# Patient Record
Sex: MALE | Race: WHITE | Employment: OTHER | URBAN - METROPOLITAN AREA
[De-identification: names, ages, dates, MRNs, and addresses within clinical notes are randomized per-mention and may not be internally consistent; named-entity substitution may affect disease eponyms.]

---

## 2019-08-26 ENCOUNTER — HOSPITAL ENCOUNTER (OUTPATIENT)
Dept: MRI IMAGING | Age: 74
Discharge: HOME | End: 2019-08-26
Attending: OPHTHALMOLOGY
Payer: MEDICARE

## 2019-08-26 DIAGNOSIS — D31.51: ICD-10-CM

## 2019-08-26 PROCEDURE — 70553 MRI BRAIN STEM W/O & W/DYE: CPT

## 2019-08-26 PROCEDURE — 74011250636 HC RX REV CODE- 250/636: Performed by: OPHTHALMOLOGY

## 2019-08-26 PROCEDURE — A9575 INJ GADOTERATE MEGLUMI 0.1ML: HCPCS | Performed by: OPHTHALMOLOGY

## 2019-08-26 RX ORDER — GADOTERATE MEGLUMINE 376.9 MG/ML
20 INJECTION INTRAVENOUS
Status: COMPLETED | OUTPATIENT
Start: 2019-08-26 | End: 2019-08-26

## 2019-08-26 RX ADMIN — GADOTERATE MEGLUMINE 20 ML: 376.9 INJECTION INTRAVENOUS at 12:55

## 2019-11-08 ENCOUNTER — HOSPITAL ENCOUNTER (OUTPATIENT)
Dept: CT IMAGING | Age: 74
Discharge: HOME | End: 2019-11-08
Attending: OTOLARYNGOLOGY
Payer: MEDICARE

## 2019-11-08 DIAGNOSIS — J32.4 CHRONIC PANSINUSITIS: ICD-10-CM

## 2019-11-08 PROCEDURE — 70486 CT MAXILLOFACIAL W/O DYE: CPT

## 2020-02-24 ENCOUNTER — ANESTHESIA EVENT (OUTPATIENT)
Dept: SURGERY | Age: 75
End: 2020-02-24
Payer: MEDICARE

## 2020-02-24 RX ORDER — BUPROPION HYDROCHLORIDE 300 MG/1
300 TABLET ORAL DAILY
COMMUNITY

## 2020-02-24 RX ORDER — MECLIZINE HYDROCHLORIDE 25 MG/1
25 TABLET ORAL
COMMUNITY

## 2020-02-24 RX ORDER — DEXTROMETHORPHAN HYDROBROMIDE, GUAIFENESIN 5; 100 MG/5ML; MG/5ML
650 LIQUID ORAL
COMMUNITY

## 2020-02-24 RX ORDER — PRIMIDONE 50 MG/1
50 TABLET ORAL
COMMUNITY

## 2020-02-24 RX ORDER — ASPIRIN 81 MG/1
81 TABLET ORAL DAILY
COMMUNITY

## 2020-02-24 RX ORDER — DOCUSATE SODIUM 100 MG/1
100 CAPSULE, LIQUID FILLED ORAL
COMMUNITY

## 2020-02-24 RX ORDER — AZATHIOPRINE 50 MG/1
50 TABLET ORAL DAILY
COMMUNITY

## 2020-02-24 RX ORDER — CARVEDILOL 12.5 MG/1
12.5 TABLET ORAL 2 TIMES DAILY WITH MEALS
COMMUNITY

## 2020-02-25 ENCOUNTER — ANESTHESIA (OUTPATIENT)
Dept: SURGERY | Age: 75
End: 2020-02-25
Payer: MEDICARE

## 2020-02-25 ENCOUNTER — HOSPITAL ENCOUNTER (OUTPATIENT)
Age: 75
Setting detail: OUTPATIENT SURGERY
Discharge: HOME | End: 2020-02-25
Attending: OTOLARYNGOLOGY | Admitting: OTOLARYNGOLOGY
Payer: MEDICARE

## 2020-02-25 VITALS
TEMPERATURE: 98.7 F | RESPIRATION RATE: 18 BRPM | HEIGHT: 70 IN | SYSTOLIC BLOOD PRESSURE: 117 MMHG | DIASTOLIC BLOOD PRESSURE: 56 MMHG | HEART RATE: 90 BPM | WEIGHT: 213 LBS | OXYGEN SATURATION: 97 % | BODY MASS INDEX: 30.49 KG/M2

## 2020-02-25 DIAGNOSIS — L76.82 PAIN AT SURGICAL INCISION: Primary | ICD-10-CM

## 2020-02-25 PROCEDURE — 77030008684 HC TU ET CUF COVD -B: Performed by: ANESTHESIOLOGY

## 2020-02-25 PROCEDURE — 74011000258 HC RX REV CODE- 258: Performed by: NURSE ANESTHETIST, CERTIFIED REGISTERED

## 2020-02-25 PROCEDURE — 74011250636 HC RX REV CODE- 250/636: Performed by: NURSE ANESTHETIST, CERTIFIED REGISTERED

## 2020-02-25 PROCEDURE — 77030009776 HC INTUB SET LACR JDMD -B: Performed by: OTOLARYNGOLOGY

## 2020-02-25 PROCEDURE — 74011250637 HC RX REV CODE- 250/637: Performed by: OPHTHALMOLOGY

## 2020-02-25 PROCEDURE — 74011250636 HC RX REV CODE- 250/636: Performed by: OTOLARYNGOLOGY

## 2020-02-25 PROCEDURE — 77030002996 HC SUT SLK J&J -A: Performed by: OTOLARYNGOLOGY

## 2020-02-25 PROCEDURE — 74011000250 HC RX REV CODE- 250

## 2020-02-25 PROCEDURE — 77030018846 HC SOL IRR STRL H20 ICUM -A: Performed by: OTOLARYNGOLOGY

## 2020-02-25 PROCEDURE — 77030026438 HC STYL ET INTUB CARD -A: Performed by: ANESTHESIOLOGY

## 2020-02-25 PROCEDURE — 74011250636 HC RX REV CODE- 250/636: Performed by: ANESTHESIOLOGY

## 2020-02-25 PROCEDURE — 77030040922 HC BLNKT HYPOTHRM STRY -A

## 2020-02-25 PROCEDURE — 74011000250 HC RX REV CODE- 250: Performed by: NURSE ANESTHETIST, CERTIFIED REGISTERED

## 2020-02-25 PROCEDURE — 77030002933 HC SUT MCRYL J&J -A: Performed by: OTOLARYNGOLOGY

## 2020-02-25 PROCEDURE — 77030002888 HC SUT CHRMC J&J -A: Performed by: OTOLARYNGOLOGY

## 2020-02-25 PROCEDURE — 74011000250 HC RX REV CODE- 250: Performed by: OTOLARYNGOLOGY

## 2020-02-25 PROCEDURE — 76010000171 HC OR TIME 2 TO 2.5 HR INTENSV-TIER 1: Performed by: OTOLARYNGOLOGY

## 2020-02-25 PROCEDURE — 77030029099 HC BN WAX SSPC -A: Performed by: OTOLARYNGOLOGY

## 2020-02-25 PROCEDURE — 74011250636 HC RX REV CODE- 250/636

## 2020-02-25 PROCEDURE — 77030002986 HC SUT PROL J&J -A: Performed by: OTOLARYNGOLOGY

## 2020-02-25 PROCEDURE — 77030040361 HC SLV COMPR DVT MDII -B: Performed by: OTOLARYNGOLOGY

## 2020-02-25 PROCEDURE — 77030031139 HC SUT VCRL2 J&J -A: Performed by: OTOLARYNGOLOGY

## 2020-02-25 PROCEDURE — 77030010208: Performed by: OTOLARYNGOLOGY

## 2020-02-25 PROCEDURE — 77030002916 HC SUT ETHLN J&J -A: Performed by: OTOLARYNGOLOGY

## 2020-02-25 PROCEDURE — 76210000017 HC OR PH I REC 1.5 TO 2 HR: Performed by: OTOLARYNGOLOGY

## 2020-02-25 PROCEDURE — 76060000035 HC ANESTHESIA 2 TO 2.5 HR: Performed by: OTOLARYNGOLOGY

## 2020-02-25 PROCEDURE — 76210000021 HC REC RM PH II 0.5 TO 1 HR: Performed by: OTOLARYNGOLOGY

## 2020-02-25 RX ORDER — ROCURONIUM BROMIDE 10 MG/ML
INJECTION, SOLUTION INTRAVENOUS AS NEEDED
Status: DISCONTINUED | OUTPATIENT
Start: 2020-02-25 | End: 2020-02-25 | Stop reason: HOSPADM

## 2020-02-25 RX ORDER — OXYMETAZOLINE HCL 0.05 %
2 SPRAY, NON-AEROSOL (ML) NASAL
Status: COMPLETED | OUTPATIENT
Start: 2020-02-25 | End: 2020-02-25

## 2020-02-25 RX ORDER — CEPHALEXIN 250 MG/1
250 CAPSULE ORAL 4 TIMES DAILY
Qty: 20 CAP | Refills: 0 | Status: SHIPPED | OUTPATIENT
Start: 2020-02-25 | End: 2020-03-01

## 2020-02-25 RX ORDER — ONDANSETRON 2 MG/ML
4 INJECTION INTRAMUSCULAR; INTRAVENOUS AS NEEDED
Status: DISCONTINUED | OUTPATIENT
Start: 2020-02-25 | End: 2020-02-25 | Stop reason: HOSPADM

## 2020-02-25 RX ORDER — LIDOCAINE HYDROCHLORIDE AND EPINEPHRINE 10; 10 MG/ML; UG/ML
INJECTION, SOLUTION INFILTRATION; PERINEURAL AS NEEDED
Status: DISCONTINUED | OUTPATIENT
Start: 2020-02-25 | End: 2020-02-25 | Stop reason: HOSPADM

## 2020-02-25 RX ORDER — FENTANYL CITRATE 50 UG/ML
50 INJECTION, SOLUTION INTRAMUSCULAR; INTRAVENOUS AS NEEDED
Status: DISCONTINUED | OUTPATIENT
Start: 2020-02-25 | End: 2020-02-25 | Stop reason: HOSPADM

## 2020-02-25 RX ORDER — SODIUM CHLORIDE, SODIUM LACTATE, POTASSIUM CHLORIDE, CALCIUM CHLORIDE 600; 310; 30; 20 MG/100ML; MG/100ML; MG/100ML; MG/100ML
125 INJECTION, SOLUTION INTRAVENOUS CONTINUOUS
Status: DISCONTINUED | OUTPATIENT
Start: 2020-02-25 | End: 2020-02-25 | Stop reason: HOSPADM

## 2020-02-25 RX ORDER — HYDROMORPHONE HYDROCHLORIDE 1 MG/ML
0.2 INJECTION, SOLUTION INTRAMUSCULAR; INTRAVENOUS; SUBCUTANEOUS
Status: DISCONTINUED | OUTPATIENT
Start: 2020-02-25 | End: 2020-02-25 | Stop reason: HOSPADM

## 2020-02-25 RX ORDER — SODIUM CHLORIDE 0.9 % (FLUSH) 0.9 %
5-40 SYRINGE (ML) INJECTION EVERY 8 HOURS
Status: DISCONTINUED | OUTPATIENT
Start: 2020-02-25 | End: 2020-02-25 | Stop reason: HOSPADM

## 2020-02-25 RX ORDER — MIDAZOLAM HYDROCHLORIDE 1 MG/ML
1 INJECTION, SOLUTION INTRAMUSCULAR; INTRAVENOUS AS NEEDED
Status: DISCONTINUED | OUTPATIENT
Start: 2020-02-25 | End: 2020-02-25 | Stop reason: HOSPADM

## 2020-02-25 RX ORDER — DIPHENHYDRAMINE HYDROCHLORIDE 50 MG/ML
12.5 INJECTION, SOLUTION INTRAMUSCULAR; INTRAVENOUS AS NEEDED
Status: DISCONTINUED | OUTPATIENT
Start: 2020-02-25 | End: 2020-02-25 | Stop reason: HOSPADM

## 2020-02-25 RX ORDER — SODIUM CHLORIDE 0.9 % (FLUSH) 0.9 %
5-40 SYRINGE (ML) INJECTION AS NEEDED
Status: DISCONTINUED | OUTPATIENT
Start: 2020-02-25 | End: 2020-02-25 | Stop reason: HOSPADM

## 2020-02-25 RX ORDER — OXYCODONE HYDROCHLORIDE 5 MG/1
5 TABLET ORAL AS NEEDED
Status: DISCONTINUED | OUTPATIENT
Start: 2020-02-25 | End: 2020-02-25 | Stop reason: HOSPADM

## 2020-02-25 RX ORDER — MIDAZOLAM HYDROCHLORIDE 1 MG/ML
0.5 INJECTION, SOLUTION INTRAMUSCULAR; INTRAVENOUS
Status: DISCONTINUED | OUTPATIENT
Start: 2020-02-25 | End: 2020-02-25 | Stop reason: HOSPADM

## 2020-02-25 RX ORDER — SODIUM CHLORIDE 9 MG/ML
25 INJECTION, SOLUTION INTRAVENOUS CONTINUOUS
Status: DISCONTINUED | OUTPATIENT
Start: 2020-02-25 | End: 2020-02-25 | Stop reason: HOSPADM

## 2020-02-25 RX ORDER — PREDNISOLONE ACETATE 10 MG/ML
SUSPENSION/ DROPS OPHTHALMIC
Qty: 3 ML | Refills: 1 | Status: SHIPPED | OUTPATIENT
Start: 2020-02-25

## 2020-02-25 RX ORDER — PHENYLEPHRINE HCL IN 0.9% NACL 0.4MG/10ML
SYRINGE (ML) INTRAVENOUS AS NEEDED
Status: DISCONTINUED | OUTPATIENT
Start: 2020-02-25 | End: 2020-02-25 | Stop reason: HOSPADM

## 2020-02-25 RX ORDER — CEFAZOLIN SODIUM 1 G/3ML
INJECTION, POWDER, FOR SOLUTION INTRAMUSCULAR; INTRAVENOUS AS NEEDED
Status: DISCONTINUED | OUTPATIENT
Start: 2020-02-25 | End: 2020-02-25 | Stop reason: HOSPADM

## 2020-02-25 RX ORDER — PROPOFOL 10 MG/ML
INJECTION, EMULSION INTRAVENOUS AS NEEDED
Status: DISCONTINUED | OUTPATIENT
Start: 2020-02-25 | End: 2020-02-25 | Stop reason: HOSPADM

## 2020-02-25 RX ORDER — SODIUM CHLORIDE, SODIUM LACTATE, POTASSIUM CHLORIDE, CALCIUM CHLORIDE 600; 310; 30; 20 MG/100ML; MG/100ML; MG/100ML; MG/100ML
25 INJECTION, SOLUTION INTRAVENOUS CONTINUOUS
Status: DISCONTINUED | OUTPATIENT
Start: 2020-02-25 | End: 2020-02-25 | Stop reason: HOSPADM

## 2020-02-25 RX ORDER — EPHEDRINE SULFATE/0.9% NACL/PF 50 MG/5 ML
SYRINGE (ML) INTRAVENOUS AS NEEDED
Status: DISCONTINUED | OUTPATIENT
Start: 2020-02-25 | End: 2020-02-25 | Stop reason: HOSPADM

## 2020-02-25 RX ORDER — NEOMYCIN SULFATE, POLYMYXIN B SULFATE, BACITRACIN ZINC, HYDROCORTISONE 3.5; 10000; 400; 1 MG/G; [USP'U]/G; [USP'U]/G; MG/G
OINTMENT OPHTHALMIC AS NEEDED
Status: DISCONTINUED | OUTPATIENT
Start: 2020-02-25 | End: 2020-02-25 | Stop reason: HOSPADM

## 2020-02-25 RX ORDER — FENTANYL CITRATE 50 UG/ML
INJECTION, SOLUTION INTRAMUSCULAR; INTRAVENOUS AS NEEDED
Status: DISCONTINUED | OUTPATIENT
Start: 2020-02-25 | End: 2020-02-25 | Stop reason: HOSPADM

## 2020-02-25 RX ORDER — TRAMADOL HYDROCHLORIDE AND ACETAMINOPHEN 37.5; 325 MG/1; MG/1
1 TABLET ORAL
Qty: 10 TAB | Refills: 0 | Status: SHIPPED | OUTPATIENT
Start: 2020-02-25 | End: 2020-02-27

## 2020-02-25 RX ORDER — LIDOCAINE HYDROCHLORIDE 20 MG/ML
INJECTION, SOLUTION EPIDURAL; INFILTRATION; INTRACAUDAL; PERINEURAL AS NEEDED
Status: DISCONTINUED | OUTPATIENT
Start: 2020-02-25 | End: 2020-02-25 | Stop reason: HOSPADM

## 2020-02-25 RX ORDER — SUCCINYLCHOLINE CHLORIDE 20 MG/ML
INJECTION INTRAMUSCULAR; INTRAVENOUS AS NEEDED
Status: DISCONTINUED | OUTPATIENT
Start: 2020-02-25 | End: 2020-02-25 | Stop reason: HOSPADM

## 2020-02-25 RX ORDER — TRAMADOL HYDROCHLORIDE AND ACETAMINOPHEN 37.5; 325 MG/1; MG/1
1 TABLET ORAL
Qty: 15 TAB | Refills: 0 | Status: SHIPPED | OUTPATIENT
Start: 2020-02-25 | End: 2020-03-06

## 2020-02-25 RX ORDER — ACETAMINOPHEN 325 MG/1
650 TABLET ORAL ONCE
Status: DISCONTINUED | OUTPATIENT
Start: 2020-02-25 | End: 2020-02-25 | Stop reason: HOSPADM

## 2020-02-25 RX ORDER — GLYCOPYRROLATE 0.2 MG/ML
INJECTION INTRAMUSCULAR; INTRAVENOUS AS NEEDED
Status: DISCONTINUED | OUTPATIENT
Start: 2020-02-25 | End: 2020-02-25 | Stop reason: HOSPADM

## 2020-02-25 RX ORDER — FENTANYL CITRATE 50 UG/ML
25 INJECTION, SOLUTION INTRAMUSCULAR; INTRAVENOUS
Status: DISCONTINUED | OUTPATIENT
Start: 2020-02-25 | End: 2020-02-25 | Stop reason: HOSPADM

## 2020-02-25 RX ORDER — ACETAMINOPHEN 10 MG/ML
INJECTION, SOLUTION INTRAVENOUS AS NEEDED
Status: DISCONTINUED | OUTPATIENT
Start: 2020-02-25 | End: 2020-02-25 | Stop reason: HOSPADM

## 2020-02-25 RX ORDER — DEXAMETHASONE SODIUM PHOSPHATE 4 MG/ML
INJECTION, SOLUTION INTRA-ARTICULAR; INTRALESIONAL; INTRAMUSCULAR; INTRAVENOUS; SOFT TISSUE AS NEEDED
Status: DISCONTINUED | OUTPATIENT
Start: 2020-02-25 | End: 2020-02-25 | Stop reason: HOSPADM

## 2020-02-25 RX ORDER — TRAMADOL HYDROCHLORIDE 50 MG/1
50 TABLET ORAL
Status: DISCONTINUED | OUTPATIENT
Start: 2020-02-25 | End: 2020-02-25 | Stop reason: HOSPADM

## 2020-02-25 RX ORDER — NEOMYCIN SULFATE, POLYMYXIN B SULFATE, AND DEXAMETHASONE 3.5; 10000; 1 MG/G; [USP'U]/G; MG/G
1 OINTMENT OPHTHALMIC 3 TIMES DAILY
Qty: 1 TUBE | Refills: 1 | Status: SHIPPED | OUTPATIENT
Start: 2020-02-25 | End: 2020-03-10

## 2020-02-25 RX ORDER — LIDOCAINE HYDROCHLORIDE 10 MG/ML
0.1 INJECTION, SOLUTION EPIDURAL; INFILTRATION; INTRACAUDAL; PERINEURAL AS NEEDED
Status: DISCONTINUED | OUTPATIENT
Start: 2020-02-25 | End: 2020-02-25 | Stop reason: HOSPADM

## 2020-02-25 RX ORDER — MORPHINE SULFATE 10 MG/ML
2 INJECTION, SOLUTION INTRAMUSCULAR; INTRAVENOUS
Status: DISCONTINUED | OUTPATIENT
Start: 2020-02-25 | End: 2020-02-25 | Stop reason: HOSPADM

## 2020-02-25 RX ADMIN — SODIUM CHLORIDE, SODIUM LACTATE, POTASSIUM CHLORIDE, AND CALCIUM CHLORIDE 25 ML/HR: 600; 310; 30; 20 INJECTION, SOLUTION INTRAVENOUS at 08:36

## 2020-02-25 RX ADMIN — DEXMEDETOMIDINE HYDROCHLORIDE 5 MCG: 100 INJECTION, SOLUTION, CONCENTRATE INTRAVENOUS at 08:41

## 2020-02-25 RX ADMIN — MITOMYCIN 1 ML: 5 INJECTION, POWDER, LYOPHILIZED, FOR SOLUTION INTRAVENOUS at 10:30

## 2020-02-25 RX ADMIN — Medication 40 MCG: at 10:16

## 2020-02-25 RX ADMIN — SUCCINYLCHOLINE CHLORIDE 120 MG: 20 INJECTION, SOLUTION INTRAMUSCULAR; INTRAVENOUS at 08:55

## 2020-02-25 RX ADMIN — Medication 10 MG: at 09:48

## 2020-02-25 RX ADMIN — GLYCOPYRROLATE 0.2 MG: 0.2 INJECTION, SOLUTION INTRAMUSCULAR; INTRAVENOUS at 09:24

## 2020-02-25 RX ADMIN — SODIUM CHLORIDE, SODIUM LACTATE, POTASSIUM CHLORIDE, AND CALCIUM CHLORIDE: 600; 310; 30; 20 INJECTION, SOLUTION INTRAVENOUS at 09:32

## 2020-02-25 RX ADMIN — Medication 80 MCG: at 10:07

## 2020-02-25 RX ADMIN — DEXMEDETOMIDINE HYDROCHLORIDE 5 MCG: 100 INJECTION, SOLUTION, CONCENTRATE INTRAVENOUS at 08:55

## 2020-02-25 RX ADMIN — Medication 40 MCG: at 10:37

## 2020-02-25 RX ADMIN — PROPOFOL 30 MG: 10 INJECTION, EMULSION INTRAVENOUS at 08:52

## 2020-02-25 RX ADMIN — DEXAMETHASONE SODIUM PHOSPHATE 10 MG: 4 INJECTION, SOLUTION INTRAMUSCULAR; INTRAVENOUS at 09:16

## 2020-02-25 RX ADMIN — Medication 10 MG: at 09:24

## 2020-02-25 RX ADMIN — OXYMETAZOLINE HYDROCHLORIDE 2 SPRAY: 0.05 SPRAY NASAL at 10:53

## 2020-02-25 RX ADMIN — ACETAMINOPHEN 1000 MG: 10 INJECTION, SOLUTION INTRAVENOUS at 09:08

## 2020-02-25 RX ADMIN — PROPOFOL 20 MG: 10 INJECTION, EMULSION INTRAVENOUS at 08:41

## 2020-02-25 RX ADMIN — FENTANYL CITRATE 50 MCG: 50 INJECTION, SOLUTION INTRAMUSCULAR; INTRAVENOUS at 09:38

## 2020-02-25 RX ADMIN — Medication 10 MG: at 09:08

## 2020-02-25 RX ADMIN — FENTANYL CITRATE 50 MCG: 50 INJECTION, SOLUTION INTRAMUSCULAR; INTRAVENOUS at 08:51

## 2020-02-25 RX ADMIN — ROCURONIUM BROMIDE 5 MG: 10 SOLUTION INTRAVENOUS at 08:52

## 2020-02-25 RX ADMIN — Medication 10 MG: at 09:21

## 2020-02-25 RX ADMIN — Medication 80 MCG: at 10:19

## 2020-02-25 RX ADMIN — Medication 80 MCG: at 09:48

## 2020-02-25 RX ADMIN — CEFAZOLIN 2 G: 330 INJECTION, POWDER, FOR SOLUTION INTRAMUSCULAR; INTRAVENOUS at 09:09

## 2020-02-25 RX ADMIN — Medication 80 MCG: at 09:54

## 2020-02-25 RX ADMIN — LIDOCAINE HYDROCHLORIDE 40 MG: 20 INJECTION, SOLUTION EPIDURAL; INFILTRATION; INTRACAUDAL; PERINEURAL at 08:53

## 2020-02-25 RX ADMIN — PROPOFOL 100 MG: 10 INJECTION, EMULSION INTRAVENOUS at 08:53

## 2020-02-25 RX ADMIN — OXYMETAZOLINE HYDROCHLORIDE 2 SPRAY: 0.05 SPRAY NASAL at 08:38

## 2020-02-25 RX ADMIN — Medication 40 MCG: at 10:34

## 2020-02-25 RX ADMIN — Medication 80 MCG: at 10:40

## 2020-02-25 RX ADMIN — Medication 10 MG: at 09:15

## 2020-02-25 NOTE — BRIEF OP NOTE
BRIEF OPERATIVE NOTE    Date of Procedure: 2/25/2020   Preoperative Diagnosis: SEPTAL DEVIATION, Epiphora due to insufficient drainage, right [H04.221], Obstruction of nasolacrimal duct, acquired, right [H04.551]  Postoperative Diagnosis: SEPTAL DEVIATION, Epiphora due to insufficient drainage, right [H04.221], Obstruction of nasolacrimal duct, acquired, right [H04.551]    Procedure(s):  SEPTOPLASTY WITH NASAL ENDOSCOPY  DACROCYSTORHINOTOMY WITH STENT RIGHT EYE  Surgeon(s) and Role:  Panel 1:     * Derick Benites MD - Primary  Panel 2:     * Norris Curling, MD - Primary         Surgical Assistant: none    Surgical Staff:  Circ-1: Mine Davies RN  Circ-2: Julia Aguilar RN  Scrub RN-1: Manjinder Feldman RN  Event Time In Time Out   Incision Start 0920    Incision Close 1052      Anesthesia: General   Estimated Blood Loss: 30mL  Specimens: * No specimens in log *   Findings: synechial bands throughout the nasal cavity.   Small perforation made on the right side   Complications: none  Implants: * No implants in log *

## 2020-02-25 NOTE — H&P
CC:   Right nasal cyst and septal deviation    HPI:     Yash Waldron is a 75 y.o. 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 He presents for surgery and has not had any major changes since his last office visit. Past Medical History:   Diagnosis Date    Benign paroxysmal positional vertigo     Carpal tunnel syndrome on right     Cataract     Depression     Essential (primary) hypertension     Essential tremor     Glaucoma     High frequency deafness     Hypercholesterolemia     Nasal septal deviation     Primary osteoarthritis, right shoulder     Tinnitus, bilateral     Ventricular premature depolarization     Vertigo      Past Surgical History:   Procedure Laterality Date    ABDOMEN SURGERY PROC UNLISTED      HX CHOLECYSTECTOMY      HX HEENT      EYE SURGERY (MULTIPLE)    HX ORTHOPAEDIC Right     CARPAL TUNNEL RELEASE    HX ORTHOPAEDIC  2014    BILATERAL JOINT REPLACEMENT     Current Facility-Administered Medications   Medication Dose Route Frequency    lactated Ringers infusion  25 mL/hr IntraVENous CONTINUOUS    0.9% sodium chloride infusion  25 mL/hr IntraVENous CONTINUOUS    sodium chloride (NS) flush 5-40 mL  5-40 mL IntraVENous Q8H    sodium chloride (NS) flush 5-40 mL  5-40 mL IntraVENous PRN    lidocaine (PF) (XYLOCAINE) 10 mg/mL (1 %) injection 0.1 mL  0.1 mL SubCUTAneous PRN    fentaNYL citrate (PF) injection 50 mcg  50 mcg IntraVENous PRN    midazolam (VERSED) injection 1 mg  1 mg IntraVENous PRN    midazolam (VERSED) injection 1 mg  1 mg IntraVENous PRN    acetaminophen (TYLENOL) tablet 650 mg  650 mg Oral ONCE    oxymetazoline (AFRIN) 0.05 % nasal spray 2 Spray  2 Spray Right Nostril ONCE PRN      Allergies   Allergen Reactions    Sunflower Oil Anaphylaxis    Adhesive Tape-Silicones Hives     History reviewed. No pertinent family history.   Social History     Tobacco Use    Smoking status: Not on file   Substance Use Topics    Alcohol use: Not on file    Drug use: Not on file         REVIEW OF SYSTEMS  A full 10 point review of systems was performed today. The review was non-pertinent other than the details already listed in the history of present illness. Visit Vitals  Pulse 72   Temp 97.8 °F (36.6 °C)   Resp 18        PHYSICAL EXAM  General:  No acute distress. Alert and oriented x 3. MSK:   Normal muscle bulk  Psych:  Mood and affect appropriate. Neuro:  CN II - XII grossly intact bilaterally. Eyes:  PERRL/EOMI, no nystagmus. ENT:   Severe septal deviation and scarring causing obstruction bilaterally but worse on the right  Lymph:  Neck soft and supple without lymphadenopathy. Resp:   No audible stridor or wheezing. Skin:   Head and neck skin is without suspicious lesions. ASSESSMENT/PLAN:  We reviewed the risks of surgery including need for additional surgery, bleeding, infection, scarring, need for additional procedures, worsened nasal airway, and the general risks of anesthesia including death as explained by the anesthesiology team.  Written consent obtained for septoplasty with right tear duct repair with Dr. Mindy Pond. Will plan to proceed with procedure as discussed. Mak Mccormick Sensor, MD Maninder Mccormick Sensor, [address], [facility] [address], [facility] [place] [facility] [place] [address]   (B) [phone]  (B) [phone]

## 2020-02-25 NOTE — BRIEF OP NOTE
BRIEF OPERATIVE NOTE    Date of Procedure: 2/25/2020   Preoperative Diagnosis: SEPTAL DEVIATION, Epiphora due to insufficient drainage, right [H04.221], Obstruction of nasolacrimal duct, acquired, right [H04.551]  Postoperative Diagnosis: * No post-op diagnosis entered *    Procedure(s):  SEPTOPLASTY WITH NASAL ENDOSCOPY  DACROCYSTORHINOTOMY WITH STENT RIGHT EYE  Surgeon(s) and Role:  Panel 1:     * Malcom Daugherty MD - Primary  Panel 2:     * Luis Haywood MD - Primary         Surgical Assistant: none    Surgical Staff:  * No surgical staff found *  No case tracking events are documented in the log.   Anesthesia: General   Estimated Blood Loss: 30cc  Specimens: * No specimens in log *   Findings: none  Complications: none  Implants: * No implants in log *

## 2020-02-25 NOTE — DISCHARGE INSTRUCTIONS
See sheet for [facility]      Discharge instructions after septoplasty:    1. Activity:  Be as active as your health allows. No heavy lifting over 10 pounds (a milk jug) or straining until cleared by your physician to do so. You may not drive while taking narcotic pain medication. Please protect your nose from trauma. Do not blow your nose. Open your mouth and keep it open when you feel the urge to sneeze. 2. Diet:  Advance your diet from liquids to a regular diet as you can tolerate. Some people experience nausea after anesthesia. This should resolve on its own. 3. Wound care:  Apply bacitracin antibiotic ointment (can be bought over the counter) to the inside of your nose, 4-5 times a day. This is best done with the end of a Cotton-tipped applicator (Q-tip). There are sutures in your nose and should not be picked at. 4. Pain:  Your pain can likely be controlled with just tylenol. You have been given a prescription for severe pain. Do not take percocet, vicodin, or tylenol/codeine on top of regular tylenol as this would be too much tylenol. Your first post op appointment should be for one week from now. If it is not already made, please call the clinic at [phone] to request an appointment. Please call that number if you experience fevers, chills, redness on your incision, excessive pain, or other concerning symptoms. [facility], [address] [facility] [address], [facility]   [address] [facility], [address]   T) [phone] (q) [phone]

## 2020-02-25 NOTE — ANESTHESIA PREPROCEDURE EVALUATION
Relevant Problems   No relevant active problems       Anesthetic History   No history of anesthetic complications            Review of Systems / Medical History  Patient summary reviewed, nursing notes reviewed and pertinent labs reviewed    Pulmonary  Within defined limits                 Neuro/Psych         Psychiatric history     Cardiovascular    Hypertension        Dysrhythmias       Exercise tolerance: >4 METS     GI/Hepatic/Renal  Within defined limits              Endo/Other        Arthritis     Other Findings   Comments: eklg wnl          Physical Exam    Airway  Mallampati: II  TM Distance: < 4 cm  Neck ROM: normal range of motion   Mouth opening: Normal     Cardiovascular  Regular rate and rhythm,  S1 and S2 normal,  no murmur, click, rub, or gallop  Rhythm: regular  Rate: normal         Dental    Dentition: Poor dentition     Pulmonary  Breath sounds clear to auscultation               Abdominal  Abdominal exam normal       Other Findings            Anesthetic Plan    ASA: 3  Anesthesia type: general          Induction: Intravenous  Anesthetic plan and risks discussed with: Patient

## 2020-02-25 NOTE — OP NOTES
[address]  OPERATIVE REPORT    Name:  Ambrose Alvarado  MR#:  [id number]  :  1945  ACCOUNT #:  [de-identified]  DATE OF SERVICE:  2020      PREOPERATIVE DIAGNOSES:  1. Septal deviation. 2.  Bullous pemphigoid of the nose. 3.  Significant intranasal synechial bands. 4.  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    POSTOPERATIVE DIAGNOSES:  1. Septal deviation. 2.  Bullous pemphigoid of the nose. 3.  Significant intranasal synechial bands. 4.  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    PROCEDURES PERFORMED:  1. Septoplasty. 2.  Lysis of bilateral intranasal synechial bands. 3.  Dacryocystorhinostomy by Dr. Geremias Shepard. SURGEONS:  Jennifer Carney MD    ASSISTANT:  Mitali Garcia MD    ANESTHESIA:  General endotracheal.    COMPLICATIONS:  None. SPECIMENS REMOVED:  None. IMPLANTS:  Bilateral Roby nasal splints. ESTIMATED BLOOD LOSS:  30 mL. INTRAVENOUS FLUIDS:  1 L. DRAINS:  None. DISPOSITION:  PACU, then home. CONDITION:  Stable. BRIEF HISTORY OF PRESENT ILLNESS:  The patient is a 41-year-old gentleman with a history of septal deviation and extensive nasal scarring from a bullous pemphigoid reaction 10 years ago. He had developed a nasolacrimal duct cyst and chronic dacryocystitis in the right eye. He presents for [facility] with Dr. Geremias Shepard and septoplasty with intranasal synechiolysis with me. The rest of surgery were discussed with him preoperatively and he freely consented. DESCRIPTION OF PROCEDURE:  The patient was identified in the preoperative holding area and brought to the operating room where he was placed in supine position. General anesthesia was induced and he was orotracheally intubated. A time-out was performed in which his name, medical record number, and the proposed procedure were agreed upon by all present.   A total of 5 mL of 1% lidocaine with 1:100,000 parts of epinephrine were injected into the septum and synechial bands throughout the nose. He was prepped and draped in a sterile fashion. I started by examining the nose. He did not have a nasal airway on the left side because there was complete nasal vestibular stenosis from synechial banding. He had a very small nasal airway on the right side from the same process. I made a right-sided hemitransfixion incision with a 15-blade and elevated bilateral submucoperichondrial mucosal flaps. I transitioned this into the submucoperiosteal plane further posteriorly. I used a [place] elevator to disarticulate the bony cartilaginous junction and then I used a Mitesh-Charline forceps to make a  horizontal cut through the perpendicular plate of the ethmoid to prevent transmission of energy to the skull base. I then removed deviated portions of bone and cartilage to allow the septum to correct back to the left side. I then used a 15-blade and made an incision through the synechial band on the right passage of the nose up to the nasal vestibule to allow for adequate visualization for the DCR. I also made a stab incision through the synechial band on the left side allowing for improved nasal airway on the left side. I then closed the incision using 3-0 chromic in an interrupted fashion. I also used several quilting stitches using 3-0 chromic to coapt the mucosal flaps. Dacryocystorhinostomy was then performed by Dr. Christian Gibson. Please see his separately dictated operative report for full information. I then verified hemostasis in the nasal cavity. I irrigated with saline and suctioned all blood and debris. I placed Roby nasal splints bilaterally and secured them in place with a 3-0 Prolene. I then turned the care of the patient over to my Anesthesia colleague, who weaned the anesthetic and extubated the patient. He was then transferred to the PACU in stable condition.         MD NEYDA Hester/YOSI_RADHA_I/  D:  02/25/2020 11:02  T: 02/25/2020 13:44  JOB #:  [id number]

## 2020-02-25 NOTE — OP NOTES
PREOPERATIVE DIAGNOSES:   1. Tearing, Right eye.  2.  Nasolacrimal duct obstruction, Right eye. POSTOPERATIVE DIAGNOSES:  1. Tearing, Right eye.  2.  Nasolacrimal duct obstruction, Right eye. OPERATIVE PROCEDURES:  1. Complicated Dacryocystorhinostomy, Right side. 2.  Nasolacrimal duct probing with stent, Right side. 3.  Septoplasty dictated separately by Dr. Norwood Showers:  Claude Trotter MD and Dr. Brinda Art:  General.      ESTIMATED BLOOD LOSS: Less than 30 mL. SPECIMENS REMOVED: None. COMPLICATIONS:    None. INDICATIONS FOR PROCEDURE:  Patient has nasolacrimal duct obstruction and tearing not controlled with conservative measures. DESCRIPTION OF PROCEDURE: After informed consent was obtained, the patient was taken to the operating room and placed on the operating table in the supine position. General anesthesia was induced by the anesthesia provider. Lidocaine 2% with Epinephrine was used to infiltrate the patients medial canthal region. An ethmoidal block was also performed. Then, two 4% Cocaine-soaked cottonoids were used to pack the patients left side of the nose. The patients face was prepped and draped in the usual sterile fashion. Dr. Sameera Koehler portion of the operation was dictated separately. First, a #15 blade was used to make an incision along the previously marked medial canthal incision line on the Right side. Two four-prong retractors were used to retract the incision. Kalis scissors were used to spread to bone. Aftabe Hane elevator was used to incise the periosteum and reflect it medially and laterally. The lateral reflection was used to encompass the nasolacrimal sac, which was pulled laterally. Then the nasolacrimal sac fossa was infractured with a pair of hemostats. Then, Kerrison Rongeurs were used to create a large bony ostium on the Right side. Next, the packing was removed.  The remaining nasal mucosa was infiltrated with local anesthesia to help with hemostasis. A #11 blade was used to create flaps from the anterior nasolacrimal sac and nasal mucosa. The posterior aspect of the nasolacrimal sac and nasal mucosa was removed with hemostats. Then a punctal dilator was used to dilate the upper and lower punctum on the Right side. The nasolacrimal duct probe was passed easily through the canalicular system, through the nasolacrimal sac and into the ostium. Then Balderas probes and stents were passed through the upper and lower canalicular system, through the nasolacrimal sac and were retrieved through the nose with the Groove director. Due to the cicatricial nasal mucosal disease, 1cc of mitomycin . 3 was injected into the flaps, adjacent mucos and nasal septum. This added extra instrumentation, surgeon physical and psychological stress and approximately 15 minutes to the operation. Next, a 6-0 silk suture was tied around the proximal end of the tubes to prevent retrograde movement of the stents. Then, a 4-0 Vicryl suture was used to sew the anterior flap of the nasal mucosa to the anterior flap of the nasolacrimal sac. Then, the subcutaneous tissues of the skin incision were closed with two buried 6-0 monocryl sutures. The skin itself was closed with a series of interrupted 6-0 plain gut sutures. The probes were then removed from the stents and the stents were tied with square knots and allowed to retract into the patients nose. The patient tolerated the procedure well without complication. The patients face was cleaned and dried. Antibiotic ointment was placed on the incision and in the left eye. A pressure patch was placed on the left eye. The patient was taken to the recovery room in stable condition. Estimated blood loss was less than 10 mL.

## 2020-02-25 NOTE — ANESTHESIA POSTPROCEDURE EVALUATION
Post-Anesthesia Evaluation and Assessment    Patient: Guicho Thomas MRN: [id number]  SSN: [id number]    YOB: 1945  Age: 75 y.o. Sex: male      I have evaluated the patient and they are stable and ready for discharge from the PACU. Cardiovascular Function/Vital Signs  Visit Vitals  /61   Pulse 95   Temp 37.3 °C (99.1 °F)   Resp 20   Ht 5' 10\" (1.778 m)   Wt 96.6 kg (213 lb)   SpO2 96%   BMI 30.56 kg/m²       Patient is status post General anesthesia for Procedure(s):  SEPTOPLASTY WITH NASAL ENDOSCOPY  DACROCYSTORHINOTOMY WITH STENT RIGHT EYE. Nausea/Vomiting: None    Postoperative hydration reviewed and adequate. Pain:  Pain Scale 1: Adult Nonverbal Pain Scale (02/25/20 1109)  Pain Intensity 1: 0 (02/25/20 1109)   Managed    Neurological Status:   Neuro (WDL): Within Defined Limits (02/25/20 1109)   At baseline    Mental Status, Level of Consciousness: Alert and  oriented to person, place, and time    Pulmonary Status:   O2 Device: Room air (02/25/20 1130)   Adequate oxygenation and airway patent    Complications related to anesthesia: None    Post-anesthesia assessment completed. No concerns    Signed By: Jb Daugherty MD     February 25, 2020              Procedure(s):  SEPTOPLASTY WITH NASAL ENDOSCOPY  DACROCYSTORHINOTOMY WITH STENT RIGHT EYE.    general    <BSHSIANPOST>    Vitals Value Taken Time   /61 2/25/2020 11:30 AM   Temp 37.3 °C (99.1 °F) 2/25/2020 11:09 AM   Pulse 94 2/25/2020 11:45 AM   Resp 24 2/25/2020 11:45 AM   SpO2 99 % 2/25/2020 11:45 AM   Vitals shown include unvalidated device data.

## 2020-02-25 NOTE — ROUTINE PROCESS
Patient: Cara Gibson MRN: [id number]  SSN: [id number] YOB: 1945  Age: 75 y.o. Sex: male Patient is status post Procedure(s): 
SEPTOPLASTY WITH NASAL ENDOSCOPY 
DACROCYSTORHINOTOMY WITH STENT RIGHT EYE. Surgeon(s) and Role: 
Panel 1: 
   * Rina Beal MD - Primary Panel 2: 
   Milo Brown MD - Primary Local/Dose/Irrigation:  See [facility] Peripheral IV 02/25/20 Right; Inner Arm (Active) Site Assessment Clean, dry, & intact 2/25/2020  8:35 AM  
Phlebitis Assessment 0 2/25/2020  8:35 AM  
Infiltration Assessment 0 2/25/2020  8:35 AM  
Dressing Status Clean, dry, & intact; New 2/25/2020  8:35 AM  
Dressing Type Transparent;Tape 2/25/2020  8:35 AM  
Hub Color/Line Status Green 2/25/2020  8:35 AM  
        
Airway - Endotracheal Tube 02/25/20 Oral (Active) Dressing/Packing:  Wound Face Wound closed right nose/lacrimal duct-Dressing Type: (none) (02/25/20 0900) Splint/Cast: Wound Face Wound closed right nose/lacrimal duct-Splint Type/Material: (ACOSTA BIVALVE FLUOROPLASTIC INTRANSAL SPLINT)] Other:

## (undated) DEVICE — CODMAN® SURGICAL PATTIES 1" X 3" (2.54CM X 7.62CM): Brand: CODMAN®

## (undated) DEVICE — PACKING 8004008 NEURAY 200PK 25X76MM: Brand: NEURAY ®

## (undated) DEVICE — KIT,ANTI FOG,W/SPONGE & FLUID,SOFT PACK: Brand: MEDLINE

## (undated) DEVICE — GARMENT,MEDLINE,DVT,INT,CALF,MED, GEN2: Brand: MEDLINE

## (undated) DEVICE — STRAP,POSITIONING,KNEE/BODY,FOAM,4X60": Brand: MEDLINE

## (undated) DEVICE — DRAPE,EENT,SPLIT,STERILE: Brand: MEDLINE

## (undated) DEVICE — CONTAINER,SPECIMEN,3OZ,OR STRL: Brand: MEDLINE

## (undated) DEVICE — GAUZE,PACKING STRIP,PLAIN,1/2"X5YD,STRL: Brand: CURAD

## (undated) DEVICE — SUTURE NONABSORBABLE MONOFILAMENT 6-0 PC-1 18 IN ETHILON 1856G

## (undated) DEVICE — BONE WAX WHITE: Brand: BONE WAX WHITE

## (undated) DEVICE — STERILE POLYISOPRENE POWDER-FREE SURGICAL GLOVES WITH EMOLLIENT COATING: Brand: PROTEXIS

## (undated) DEVICE — INFECTION CONTROL KIT SYS

## (undated) DEVICE — SOLUTION IRRIG 1000ML H2O STRL BLT

## (undated) DEVICE — GOWN,SIRUS,FABRNF,XL,20/CS: Brand: MEDLINE

## (undated) DEVICE — SUT SLK 6-0 18IN G1 DA BLK --

## (undated) DEVICE — SUT PROL 3-0 30IN RB1 BLU --

## (undated) DEVICE — SWAB SURG L76CM COT ROUNDED PT TIP VISISWAB

## (undated) DEVICE — STERILE POLYISOPRENE POWDER-FREE SURGICAL GLOVES: Brand: PROTEXIS

## (undated) DEVICE — SUTURE CHROMIC GUT SZ 3-0 L27IN ABSRB BRN L17MM RB-1 1/2 U204H

## (undated) DEVICE — Device

## (undated) DEVICE — INTENDED FOR TISSUE SEPARATION, AND OTHER PROCEDURES THAT REQUIRE A SHARP SURGICAL BLADE TO PUNCTURE OR CUT.: Brand: BARD-PARKER ® CARBON RIB-BACK BLADES

## (undated) DEVICE — TUBING, SUCTION, 1/4" X 12', STRAIGHT: Brand: MEDLINE

## (undated) DEVICE — SURGICAL PROCEDURE PACK STRAB BSR LF

## (undated) DEVICE — PAD NON-ADHERENT 3X4 STRL LF --

## (undated) DEVICE — SUTURE MCRYL SZ 6-0 L18IN ABSRB UD L13MM P-3 3/8 CIR PRIM Y492G

## (undated) DEVICE — SYR 10ML LUER LOK 1/5ML GRAD --

## (undated) DEVICE — MARKER,SKIN,WI/RULER AND LABELS: Brand: MEDLINE

## (undated) DEVICE — SUTURE VCRL SZ 4-0 L18IN ABSRB UD L16MM PS-4 1/2 CIR PRIM J507G

## (undated) DEVICE — CODMAN® SURGICAL PATTIES 1/2" X 3" (1.27CM X 7.62CM): Brand: CODMAN®

## (undated) DEVICE — LACRIMAL INTUBATION SET CRAWFORD: Brand: CRAWFORD

## (undated) DEVICE — SYRINGE MED L0.5IN OD30GA 1ML LL W/ SFTY PIVOTING SHLD

## (undated) DEVICE — NEEDLE HYPO 27GA L1.25IN GRY POLYPR HUB S STL REG BVL STR

## (undated) DEVICE — CATH SUC CTRL PRT 10FR LF STRL --

## (undated) DEVICE — SYRINGE IRRIG 60ML SFT PLIABLE BLB EZ TO GRP 1 HND USE W/